# Patient Record
Sex: MALE | Race: WHITE
[De-identification: names, ages, dates, MRNs, and addresses within clinical notes are randomized per-mention and may not be internally consistent; named-entity substitution may affect disease eponyms.]

---

## 2017-01-02 ENCOUNTER — HOSPITAL ENCOUNTER (EMERGENCY)
Dept: HOSPITAL 83 - ED | Age: 52
Discharge: HOME | End: 2017-01-02
Payer: COMMERCIAL

## 2017-01-02 VITALS — HEIGHT: 70.98 IN | BODY MASS INDEX: 33.6 KG/M2 | WEIGHT: 240 LBS

## 2017-01-02 VITALS — SYSTOLIC BLOOD PRESSURE: 118 MMHG | DIASTOLIC BLOOD PRESSURE: 83 MMHG

## 2017-01-02 DIAGNOSIS — F17.200: ICD-10-CM

## 2017-01-02 DIAGNOSIS — Z88.0: ICD-10-CM

## 2017-01-02 DIAGNOSIS — J20.9: Primary | ICD-10-CM

## 2017-01-05 ENCOUNTER — HOSPITAL ENCOUNTER (OUTPATIENT)
Dept: HOSPITAL 83 - RAD | Age: 52
End: 2017-01-05
Payer: COMMERCIAL

## 2017-01-05 DIAGNOSIS — J40: Primary | ICD-10-CM

## 2017-01-05 DIAGNOSIS — Z72.0: ICD-10-CM

## 2017-01-05 DIAGNOSIS — R91.1: ICD-10-CM

## 2017-08-03 ENCOUNTER — HOSPITAL ENCOUNTER (EMERGENCY)
Dept: HOSPITAL 83 - ED | Age: 52
Discharge: HOME | End: 2017-08-03
Payer: COMMERCIAL

## 2017-08-03 VITALS — BODY MASS INDEX: 36.4 KG/M2 | WEIGHT: 260 LBS | HEIGHT: 70.98 IN

## 2017-08-03 VITALS — DIASTOLIC BLOOD PRESSURE: 93 MMHG

## 2017-08-03 DIAGNOSIS — Z88.0: ICD-10-CM
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
## 2017-08-03 DIAGNOSIS — F17.200: ICD-10-CM

## 2017-08-03 DIAGNOSIS — L03.011: ICD-10-CM

## 2017-08-03 DIAGNOSIS — T23.241A: Primary | ICD-10-CM
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
## 2017-08-09 ENCOUNTER — HOSPITAL ENCOUNTER (OUTPATIENT)
Dept: HOSPITAL 83 - WOUNDCARE | Age: 52
End: 2017-08-09
Attending: INTERNAL MEDICINE
Payer: COMMERCIAL
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
## 2017-08-09 DIAGNOSIS — Z87.01: ICD-10-CM

## 2017-08-09 DIAGNOSIS — T31.0: ICD-10-CM
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
## 2017-08-09 DIAGNOSIS — T23.241A: Primary | ICD-10-CM
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
## 2017-08-09 DIAGNOSIS — F17.210: ICD-10-CM
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
## 2017-08-16 ENCOUNTER — HOSPITAL ENCOUNTER (OUTPATIENT)
Dept: HOSPITAL 83 - WOUNDCARE | Age: 52
Discharge: HOME | End: 2017-08-16
Attending: INTERNAL MEDICINE
Payer: COMMERCIAL

## 2017-08-16 DIAGNOSIS — T23.241D: Primary | ICD-10-CM
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
## 2017-08-23 ENCOUNTER — HOSPITAL ENCOUNTER (OUTPATIENT)
Dept: HOSPITAL 83 - WOUNDCARE | Age: 52
Discharge: HOME | End: 2017-08-23
Attending: INTERNAL MEDICINE
Payer: COMMERCIAL

## 2017-08-23 DIAGNOSIS — T23.211D: Primary | ICD-10-CM
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
## 2017-08-23 DIAGNOSIS — T31.0: ICD-10-CM

## 2017-08-30 ENCOUNTER — HOSPITAL ENCOUNTER (OUTPATIENT)
Dept: HOSPITAL 83 - WOUNDCARE | Age: 52
Discharge: HOME | End: 2017-08-30
Attending: INTERNAL MEDICINE
Payer: COMMERCIAL

## 2017-08-30 DIAGNOSIS — T23.241D: Primary | ICD-10-CM
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
## 2017-08-30 DIAGNOSIS — T31.0: ICD-10-CM

## 2019-07-25 ENCOUNTER — HOSPITAL ENCOUNTER (EMERGENCY)
Dept: HOSPITAL 83 - ED | Age: 54
Discharge: HOME | End: 2019-07-25
Payer: SELF-PAY

## 2019-07-25 VITALS — SYSTOLIC BLOOD PRESSURE: 132 MMHG | DIASTOLIC BLOOD PRESSURE: 80 MMHG

## 2019-07-25 VITALS — BODY MASS INDEX: 33.6 KG/M2 | HEIGHT: 70.98 IN | WEIGHT: 240 LBS

## 2019-07-25 DIAGNOSIS — S80.862A: ICD-10-CM
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
## 2019-07-25 DIAGNOSIS — Z88.0: ICD-10-CM

## 2019-07-25 DIAGNOSIS — S80.861A: ICD-10-CM
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
## 2019-07-25 DIAGNOSIS — S80.261A: Primary | ICD-10-CM

## 2020-07-24 ENCOUNTER — HOSPITAL ENCOUNTER (INPATIENT)
Dept: HOSPITAL 83 - ED | Age: 55
LOS: 3 days | Discharge: HOME | DRG: 720 | End: 2020-07-27
Attending: EMERGENCY MEDICINE | Admitting: EMERGENCY MEDICINE
Payer: COMMERCIAL

## 2020-07-24 VITALS — DIASTOLIC BLOOD PRESSURE: 85 MMHG

## 2020-07-24 VITALS — DIASTOLIC BLOOD PRESSURE: 72 MMHG | SYSTOLIC BLOOD PRESSURE: 117 MMHG

## 2020-07-24 VITALS — SYSTOLIC BLOOD PRESSURE: 106 MMHG | DIASTOLIC BLOOD PRESSURE: 81 MMHG

## 2020-07-24 VITALS — WEIGHT: 251.06 LBS | BODY MASS INDEX: 35.15 KG/M2 | HEIGHT: 70.98 IN

## 2020-07-24 DIAGNOSIS — A41.9: Primary | ICD-10-CM

## 2020-07-24 DIAGNOSIS — F32.9: ICD-10-CM

## 2020-07-24 DIAGNOSIS — Z82.49: ICD-10-CM

## 2020-07-24 DIAGNOSIS — Z88.0: ICD-10-CM

## 2020-07-24 DIAGNOSIS — R65.20: ICD-10-CM

## 2020-07-24 DIAGNOSIS — E83.41: ICD-10-CM

## 2020-07-24 DIAGNOSIS — D64.9: ICD-10-CM

## 2020-07-24 DIAGNOSIS — E66.9: ICD-10-CM

## 2020-07-24 DIAGNOSIS — E44.0: ICD-10-CM

## 2020-07-24 DIAGNOSIS — L03.111: ICD-10-CM

## 2020-07-24 DIAGNOSIS — R73.9: ICD-10-CM

## 2020-07-24 DIAGNOSIS — J44.9: ICD-10-CM

## 2020-07-24 DIAGNOSIS — I25.10: ICD-10-CM

## 2020-07-24 DIAGNOSIS — F17.210: ICD-10-CM

## 2020-07-24 DIAGNOSIS — L03.112: ICD-10-CM

## 2020-07-24 LAB
ALBUMIN SERPL-MCNC: 3.2 GM/DL (ref 3.1–4.5)
ALP SERPL-CCNC: 81 U/L (ref 45–117)
ALT SERPL W P-5'-P-CCNC: 21 U/L (ref 12–78)
AST SERPL-CCNC: 9 IU/L (ref 3–35)
BUN SERPL-MCNC: 9 MG/DL (ref 7–24)
BURR CELLS BLD QL SMEAR: (no result)
CHLORIDE SERPL-SCNC: 106 MMOL/L (ref 98–107)
CREAT SERPL-MCNC: 0.98 MG/DL (ref 0.7–1.3)
ERYTHROCYTE [DISTWIDTH] IN BLOOD BY AUTOMATED COUNT: 13.4 % (ref 0–14.5)
HCT VFR BLD AUTO: 38.7 % (ref 42–52)
MCH RBC QN AUTO: 28.5 PG (ref 27–31)
MCHC RBC AUTO-ENTMCNC: 32.6 G/DL (ref 33–37)
MCV RBC AUTO: 87.6 FL (ref 80–94)
NRBC BLD QL AUTO: 0 % (ref 0–0)
PLATELET # BLD AUTO: 273 10*3/UL (ref 130–400)
PLATELET SUFFICIENCY: NORMAL
PMV BLD AUTO: 9.5 FL (ref 9.6–12.3)
POTASSIUM SERPL-SCNC: 3.6 MMOL/L (ref 3.5–5.1)
PROT SERPL-MCNC: 7.4 GM/DL (ref 6.4–8.2)
RBC # BLD AUTO: 4.42 10*6/UL (ref 4.5–5.9)
SODIUM SERPL-SCNC: 135 MMOL/L (ref 136–145)
TOTAL CELLS COUNTED: 100 #CELLS
WBC NRBC COR # BLD AUTO: 20.2 10*3/UL (ref 4.8–10.8)

## 2020-07-24 NOTE — NUR
PT TO BE TRANSFERED  AFTER SHIFT CHANGE PER NURSING SUPERVISOR. PT
RESTING WITH EYES CLOSED, ANTIBOTICS IN FUSING PER ORDER. CALL LIGHT IN REACH
WILL MONITOR.

## 2020-07-24 NOTE — NUR
Time: 2045
A 55  year old M admitted to 5E
under services of DEEPAK GOMEZ DO.
Pt. arrived via stretcher from
[facility].  Chief complaint: BILATERAL AXILLARY ABCESSES.
 
DIOR LAZAR

## 2020-07-24 NOTE — NUR
CALLED BACK REGARDING CONSULTION. WILL SEE PATIENT IN MORNING,
POSSIBLY SET UP FOR A I&D ON SUNDAY.

## 2020-07-24 NOTE — NUR
PT TO BE ADMITTED, NO BED ASSIGNMENT OR NURSE AVAILABLE UNTIL AFTER SHIFT
CHANGE PER KENDALL RN, RECEIVED REPORT FROM

## 2020-07-25 VITALS — DIASTOLIC BLOOD PRESSURE: 53 MMHG

## 2020-07-25 VITALS — DIASTOLIC BLOOD PRESSURE: 74 MMHG

## 2020-07-25 VITALS — DIASTOLIC BLOOD PRESSURE: 55 MMHG

## 2020-07-25 VITALS — DIASTOLIC BLOOD PRESSURE: 69 MMHG | SYSTOLIC BLOOD PRESSURE: 120 MMHG

## 2020-07-25 VITALS — DIASTOLIC BLOOD PRESSURE: 62 MMHG

## 2020-07-25 LAB
25(OH)D3 SERPL-MCNC: 28 NG/ML (ref 30–100)
BASOPHILS # BLD AUTO: 0 10*3/UL (ref 0–0.1)
BASOPHILS NFR BLD AUTO: 0.2 % (ref 0–1)
BUN SERPL-MCNC: 11 MG/DL (ref 7–24)
CHLORIDE SERPL-SCNC: 107 MMOL/L (ref 98–107)
CHOLEST SERPL-MCNC: 112 MG/DL (ref ?–200)
CREAT SERPL-MCNC: 1.02 MG/DL (ref 0.7–1.3)
EOSINOPHIL # BLD AUTO: 0.3 10*3/UL (ref 0–0.4)
EOSINOPHIL # BLD AUTO: 2.3 % (ref 1–4)
ERYTHROCYTE [DISTWIDTH] IN BLOOD BY AUTOMATED COUNT: 13.4 % (ref 0–14.5)
HCT VFR BLD AUTO: 36.9 % (ref 42–52)
HDLC SERPL-MCNC: 27 MG/DL (ref 40–60)
LDLC SERPL DIRECT ASSAY-MCNC: 62 MG/DL (ref 9–159)
LYMPHOCYTES # BLD AUTO: 1.6 10*3/UL (ref 1.3–4.4)
LYMPHOCYTES NFR BLD AUTO: 12.4 % (ref 27–41)
MCH RBC QN AUTO: 28.7 PG (ref 27–31)
MCHC RBC AUTO-ENTMCNC: 32.8 G/DL (ref 33–37)
MCV RBC AUTO: 87.4 FL (ref 80–94)
MONOCYTES # BLD AUTO: 1 10*3/UL (ref 0.1–1)
MONOCYTES NFR BLD MANUAL: 7.7 % (ref 3–9)
NEUT #: 10.1 10*3/UL (ref 2.3–7.9)
NEUT %: 76.9 % (ref 47–73)
NRBC BLD QL AUTO: 0 10*3/UL (ref 0–0)
PLATELET # BLD AUTO: 271 10*3/UL (ref 130–400)
PMV BLD AUTO: 9.9 FL (ref 9.6–12.3)
POTASSIUM SERPL-SCNC: 3.9 MMOL/L (ref 3.5–5.1)
RBC # BLD AUTO: 4.22 10*6/UL (ref 4.5–5.9)
SODIUM SERPL-SCNC: 138 MMOL/L (ref 136–145)
TRIGL SERPL-MCNC: 116 MG/DL (ref ?–150)
VITAMIN B12: 306 PG/ML (ref 247–911)
VLDLC SERPL CALC-MCNC: 23 MG/DL (ref 6–40)
WBC NRBC COR # BLD AUTO: 13.1 10*3/UL (ref 4.8–10.8)

## 2020-07-25 NOTE — NUR
PT NOTIFIED THAT HE WILL HAVE CT OF BILATERAL AXILLA TODAY AT 1930 AND IS
AWARE THAT HE IS TO BE NPO AT THIS TIME.

## 2020-07-25 NOTE — NUR
PT GIVEN MORPHINE AT THIS TIME FOR C/O ARMPIT PAIN. RATES PAIN AN 8/10. WILL
MONITOR FOR EFFECTIVENESS. CALL LIGHT IN REACH.

## 2020-07-25 NOTE — NUR
PT GIVEN MORPHINE AT THIS TIME FOR C/O BILATERAL ARMPIT PAIN. WILL MONITOR FOR
EFFECTIVENESS. CALL LIGHT IN REACH.

## 2020-07-25 NOTE — NUR
in to talk to patient.
Patient states lives at HOME with MOTHER.
There are 12 steps in the home.
Physician: NONE AT THIS TIME
Pharmacy: [facility]: NONE
Patient's level of ADLs: INDEPENDENT
Patient has working utilities: YES
DME: NONE
Follow-up physician's appointment after d/c: DOES NOT HAVE PCP AT THIS TIME
Does patient want to access PORTAL?: NO
Discharge plan PT LIVES AT HOME WITH HIS MOTHER AND IS INDEPENDENT IN HIS
CARE. DISCUSSED HOME NEEDS WITH PT BUT HE DECLINES ANY AT THIS TIME. WILL
CONTINUE TO FOLLOW.  PLAN IS TO RETURN HOME WITH MOTHER ON DISCHARGE.  STATES
HE WILL HAVE A RIDE HOME..
 
LONG,GENIA

## 2020-07-25 NOTE — NUR
LANG FROM CT CALLS THIS NURSE AND STATES THAT THERE HAS BEEN A MALFUNCTION
WITH CT MACHINE AND THAT SHE WAS UNABLE TO OBTAIN IMAGES OF PT BILATERAL
AXILLA.  CT TECH STATES THAT PT IS C/O BEING HUNGRY.  DR HUSAIN NOTIFIED OF
THIS AND STATES THAT THIS NURSE CAN ASK PATIENT WHETHER OR NOT HE WANTS TO EAT
RIGHT NOW OR WAIT FOR AN HOUR FOR CT MACHINE TO BE FIXED. PT ASKED AND STATES
THAT HE WOULD LIKE TO EAT NOW AND WILL BE NPO FOR ANOTHER 4 HOURS FOR CT OF
AXILLA AT A LATER TIME TODAY. DIET ORDER PLACED.

## 2020-07-25 NOTE — NUR
NORCO GIVEN PER PT REQUEST FOR BLE AXILLA PAIN. AREAS ARE RED, SWOLLEN WITH
PALPABLE MASSES, NO OPEN AREAS.

## 2020-07-25 NOTE — NUR
SMELL OF CIGARETTE SMOKE COMING FROM PTS ROOM. PT QUESTIONED IF HE WAS SMOKING
AND HE STATES NO. WHEN ASKED IF HE IS A SMOKER HE STATES NO. PT HAS NO VISIBLE
CIGARETTES/LIGHTER. SS NOTIFIED.

## 2020-07-25 NOTE — NUR
TOOK OVER CARE OF PT AT THIS TIME. PT RESTING IN BED. NO S/S OF DISTRESS.
RESPIRATIONS EASY AND UNLABORED. CALL LIGHT IN REACH.

## 2020-07-26 VITALS — DIASTOLIC BLOOD PRESSURE: 65 MMHG

## 2020-07-26 VITALS — DIASTOLIC BLOOD PRESSURE: 81 MMHG | SYSTOLIC BLOOD PRESSURE: 135 MMHG

## 2020-07-26 VITALS — DIASTOLIC BLOOD PRESSURE: 74 MMHG | SYSTOLIC BLOOD PRESSURE: 116 MMHG

## 2020-07-26 VITALS — DIASTOLIC BLOOD PRESSURE: 75 MMHG

## 2020-07-26 VITALS — DIASTOLIC BLOOD PRESSURE: 70 MMHG

## 2020-07-26 LAB
ALBUMIN SERPL-MCNC: 2.8 GM/DL (ref 3.1–4.5)
ALP SERPL-CCNC: 75 U/L (ref 45–117)
ALT SERPL W P-5'-P-CCNC: 23 U/L (ref 12–78)
AST SERPL-CCNC: 10 IU/L (ref 3–35)
BASOPHILS # BLD AUTO: 0 10*3/UL (ref 0–0.1)
BASOPHILS NFR BLD AUTO: 0.3 % (ref 0–1)
BUN SERPL-MCNC: 12 MG/DL (ref 7–24)
CHLORIDE SERPL-SCNC: 110 MMOL/L (ref 98–107)
CREAT SERPL-MCNC: 0.91 MG/DL (ref 0.7–1.3)
EOSINOPHIL # BLD AUTO: 0.4 10*3/UL (ref 0–0.4)
EOSINOPHIL # BLD AUTO: 2.7 % (ref 1–4)
ERYTHROCYTE [DISTWIDTH] IN BLOOD BY AUTOMATED COUNT: 13.6 % (ref 0–14.5)
HCT VFR BLD AUTO: 39.3 % (ref 42–52)
LYMPHOCYTES # BLD AUTO: 1.7 10*3/UL (ref 1.3–4.4)
LYMPHOCYTES NFR BLD AUTO: 11.4 % (ref 27–41)
MCH RBC QN AUTO: 29.3 PG (ref 27–31)
MCHC RBC AUTO-ENTMCNC: 32.8 G/DL (ref 33–37)
MCV RBC AUTO: 89.1 FL (ref 80–94)
MONOCYTES # BLD AUTO: 1 10*3/UL (ref 0.1–1)
MONOCYTES NFR BLD MANUAL: 7 % (ref 3–9)
NEUT #: 11.3 10*3/UL (ref 2.3–7.9)
NEUT %: 77.6 % (ref 47–73)
NRBC BLD QL AUTO: 0 % (ref 0–0)
PLATELET # BLD AUTO: 338 10*3/UL (ref 130–400)
PMV BLD AUTO: 9.5 FL (ref 9.6–12.3)
POTASSIUM SERPL-SCNC: 4.1 MMOL/L (ref 3.5–5.1)
PROT SERPL-MCNC: 7.3 GM/DL (ref 6.4–8.2)
RBC # BLD AUTO: 4.41 10*6/UL (ref 4.5–5.9)
SODIUM SERPL-SCNC: 139 MMOL/L (ref 136–145)
WBC NRBC COR # BLD AUTO: 14.6 10*3/UL (ref 4.8–10.8)

## 2020-07-26 NOTE — NUR
PATIENT RESTING IN BED. DROWSY. VOICES NO CONCERNS. ASSESSMENT COMPLETE. VSS.
RESPS EASY AND REGULAR. CALL LIGHT IN REACH. WILL MONITOR.

## 2020-07-27 VITALS — DIASTOLIC BLOOD PRESSURE: 82 MMHG | SYSTOLIC BLOOD PRESSURE: 127 MMHG

## 2020-07-27 VITALS — DIASTOLIC BLOOD PRESSURE: 74 MMHG

## 2020-07-27 VITALS — DIASTOLIC BLOOD PRESSURE: 63 MMHG

## 2020-07-27 LAB
BASOPHILS # BLD AUTO: 0.1 10*3/UL (ref 0–0.1)
BASOPHILS NFR BLD AUTO: 0.4 % (ref 0–1)
BUN SERPL-MCNC: 11 MG/DL (ref 7–24)
CHLORIDE SERPL-SCNC: 109 MMOL/L (ref 98–107)
CREAT SERPL-MCNC: 0.9 MG/DL (ref 0.7–1.3)
EOSINOPHIL # BLD AUTO: 0.4 10*3/UL (ref 0–0.4)
EOSINOPHIL # BLD AUTO: 2.9 % (ref 1–4)
ERYTHROCYTE [DISTWIDTH] IN BLOOD BY AUTOMATED COUNT: 13.3 % (ref 0–14.5)
HCT VFR BLD AUTO: 40.3 % (ref 42–52)
LYMPHOCYTES # BLD AUTO: 2.2 10*3/UL (ref 1.3–4.4)
LYMPHOCYTES NFR BLD AUTO: 14.8 % (ref 27–41)
MCH RBC QN AUTO: 28.7 PG (ref 27–31)
MCHC RBC AUTO-ENTMCNC: 32.5 G/DL (ref 33–37)
MCV RBC AUTO: 88.4 FL (ref 80–94)
MONOCYTES # BLD AUTO: 1 10*3/UL (ref 0.1–1)
MONOCYTES NFR BLD MANUAL: 6.9 % (ref 3–9)
NEUT #: 10.9 10*3/UL (ref 2.3–7.9)
NEUT %: 73.7 % (ref 47–73)
NRBC BLD QL AUTO: 0 10*3/UL (ref 0–0)
PLATELET # BLD AUTO: 396 10*3/UL (ref 130–400)
PMV BLD AUTO: 9.5 FL (ref 9.6–12.3)
POTASSIUM SERPL-SCNC: 4.2 MMOL/L (ref 3.5–5.1)
RBC # BLD AUTO: 4.56 10*6/UL (ref 4.5–5.9)
SODIUM SERPL-SCNC: 139 MMOL/L (ref 136–145)
WBC NRBC COR # BLD AUTO: 14.7 10*3/UL (ref 4.8–10.8)

## 2020-07-27 NOTE — NUR
PATIENT RESTING IN BED. VOICES NO CONCERNS. ASSESSMENT COMPLETE. RESPS EASY
AND REGULAR. CALL LIGHT IN REACH.

## 2020-07-27 NOTE — NUR
Discharge instructions reviewed with patient/family. Patient receptive and
verbalizes understanding. Follow-up care arranged. Written instructions given
to patient/family. IV REMOVED.
FRANKO CASAREZ

## 2020-07-27 NOTE — NUR
case management visits with patient, he states he will return home when
discharged and denies any home needs

## 2022-11-10 NOTE — NUR
24 HR chart check completed. PSR advised   November 29 at 8 am or at 11 am  Requested PSR Rayna call the patient to schedule.